# Patient Record
Sex: MALE | Race: WHITE | NOT HISPANIC OR LATINO | Employment: OTHER | URBAN - NONMETROPOLITAN AREA
[De-identification: names, ages, dates, MRNs, and addresses within clinical notes are randomized per-mention and may not be internally consistent; named-entity substitution may affect disease eponyms.]

---

## 2023-09-15 ENCOUNTER — TELEPHONE (OUTPATIENT)
Dept: NEUROSURGERY | Facility: CLINIC | Age: 78
End: 2023-09-15
Payer: MEDICARE

## 2023-09-15 ENCOUNTER — OFFICE VISIT (OUTPATIENT)
Dept: NEUROSURGERY | Facility: CLINIC | Age: 78
End: 2023-09-15
Payer: MEDICARE

## 2023-09-15 VITALS — HEIGHT: 72 IN | WEIGHT: 192 LBS | BODY MASS INDEX: 26.01 KG/M2

## 2023-09-15 DIAGNOSIS — M51.36 LUMBAR DEGENERATIVE DISC DISEASE: Primary | ICD-10-CM

## 2023-09-15 DIAGNOSIS — M54.50 LUMBAR BACK PAIN: ICD-10-CM

## 2023-09-15 DIAGNOSIS — G60.9 HEREDITARY AND IDIOPATHIC PERIPHERAL NEUROPATHY: ICD-10-CM

## 2023-09-15 DIAGNOSIS — F17.200 SMOKER: ICD-10-CM

## 2023-09-15 PROBLEM — G89.29 CHRONIC LOW BACK PAIN: Status: ACTIVE | Noted: 2023-09-15

## 2023-09-15 RX ORDER — FAMOTIDINE 20 MG/1
20 TABLET, FILM COATED ORAL DAILY
COMMUNITY

## 2023-09-15 RX ORDER — NITROGLYCERIN 0.4 MG/1
0.4 TABLET SUBLINGUAL
COMMUNITY

## 2023-09-15 RX ORDER — SIMVASTATIN 40 MG
40 TABLET ORAL NIGHTLY
COMMUNITY
Start: 2023-06-22

## 2023-09-15 RX ORDER — ASPIRIN 81 MG/1
81 TABLET ORAL DAILY
COMMUNITY

## 2023-09-15 RX ORDER — GABAPENTIN 300 MG/1
300 CAPSULE ORAL 3 TIMES DAILY
COMMUNITY

## 2023-09-15 RX ORDER — AMLODIPINE BESYLATE 5 MG/1
5 TABLET ORAL
COMMUNITY

## 2023-09-15 RX ORDER — SILDENAFIL 100 MG/1
100 TABLET, FILM COATED ORAL AS NEEDED
COMMUNITY

## 2023-09-15 RX ORDER — CETIRIZINE HYDROCHLORIDE 10 MG/1
10 TABLET ORAL DAILY
COMMUNITY
Start: 2023-06-22

## 2023-09-15 RX ORDER — EPINEPHRINE 0.3 MG/.3ML
0.3 INJECTION SUBCUTANEOUS DAILY PRN
COMMUNITY

## 2023-09-15 NOTE — PROGRESS NOTES
"    Chief complaint:   Chief Complaint   Patient presents with    Post-op     3rd post op visit from lumbar surgery with Dr. Noguera on March 23 2023. Having some lower back pain and numbness in his legs       Subjective     HPI: Bony is 6 months status post lumbar fusion.  He is having some low-grade pain in the right SI joint region and intermittent paresthesias into the anterior thigh and lower leg.  Preoperatively he was having severe right leg weakness and indicates that the weakness has significantly improved.  He is not requiring any medications.  He does have history of peripheral neuropathy and is taking 1500 mg of gabapentin twice daily.  He admits that he has not been compliant with his lifting restrictions.  He denies any new radicular symptoms.    Review of Systems   HENT:  Positive for postnasal drip.    Musculoskeletal:  Positive for arthralgias.   Neurological:  Positive for numbness.      Past Medical History:   Diagnosis Date    Arthritis     DM (diabetes mellitus)     Hepatitis C      Past Surgical History:   Procedure Laterality Date    BACK SURGERY      x 3    HERNIA REPAIR      NECK SURGERY      x 1    TOTAL KNEE ARTHROPLASTY       History reviewed. No pertinent family history.  Social History     Tobacco Use    Smoking status: Every Day     Types: Cigarettes, Cigars    Smokeless tobacco: Never   Substance Use Topics    Alcohol use: Yes     Comment: 1 glass of [place] at night     (Not in a hospital admission)    Allergies:  Meperidine, Oxycodone-acetaminophen, Ibuprofen, Hydrocodone-acetaminophen, Acetaminophen, and Colchicine    Objective      Vital Signs  Ht 182.9 cm (72\")   Wt 87.1 kg (192 lb)   BMI 26.04 kg/m²     Physical Exam  Constitutional:       Appearance: Normal appearance. He is well-developed.   HENT:      Head: Normocephalic.   Eyes:      General: Lids are normal.      Conjunctiva/sclera: Conjunctivae normal.   Pulmonary:      Effort: Pulmonary effort is normal.      Breath sounds: " Normal breath sounds.   Musculoskeletal:         General: Normal range of motion.      Cervical back: Normal range of motion.   Skin:     General: Skin is warm.   Neurological:      Mental Status: He is alert and oriented to person, place, and time.      GCS: GCS eye subscore is 4. GCS verbal subscore is 5. GCS motor subscore is 6.      Cranial Nerves: No cranial nerve deficit.      Sensory: Sensory deficit present.      Gait: Gait is intact.      Deep Tendon Reflexes: Reflexes are normal and symmetric. Reflexes normal.   Psychiatric:         Speech: Speech normal.         Behavior: Behavior normal.         Thought Content: Thought content normal.     Neurologic Exam     Mental Status   Oriented to person, place, and time.   Speech: speech is normal   Level of consciousness: alert  Knowledge: good.     Motor Exam     Strength   Right iliopsoas: 5/5  Left iliopsoas: 5/5  Right quadriceps: 5/5  Left quadriceps: 5/5  Right hamstrin/5  Left hamstrin/5  Right glutei: 5/5  Left glutei: 5/5  Right anterior tibial: 5/5  Left anterior tibial: 5/5  Right posterior tibial: 5/5  Left posterior tibial: 5/5  Right peroneal: 5/5  Left peroneal: 5/5  Right gastroc: 5/5  Left gastroc: 5/5    Sensory Exam   Sensory deficit distribution on right: L5    Gait, Coordination, and Reflexes     Gait  Gait: normal        Assessment/Plan: Bony is doing well status post lumbar fusion 6 months ago.  He is neurologically stable.  He understands that the paresthesias affecting his right leg may not completely.  I again discussed activity restrictions with him in detail.  I would like to get a CT of lumbar spine without contrast to make sure he is fusing.  He will follow-up with Dr. Noguera for the CT is completed for review.  I advised the patient to call and return sooner for new or worsening complaints of weakness, paresthesias, gait disturbances, or any additional concerns.  Treatment options discussed in detail with Sidney and the  patient voiced understanding.  Mr. Marquez agrees with this plan of care.     Patient is a smoker. Smoking cessation classes given to the patient  The patient's Body mass index is 26.04 kg/m².. BMI is within normal parameters. No follow-up required.  Advance Care Planning     ADVANCED CARE PLANNING  Information on advance directives provided in AVS.    NARCISA Fall Risk Assessment was completed, and patient is at LOW risk for falls.Assessment completed on:9/15/2023       Diagnoses and all orders for this visit:    1. Lumbar degenerative disc disease (Primary)  -     CT Lumbar Spine Without Contrast; Future    2. Lumbar back pain  -     CT Lumbar Spine Without Contrast; Future    3. Hereditary and idiopathic peripheral neuropathy    4. Smoker    5. Body mass index (BMI) of 26.0-26.9 in adult          I discussed the patients findings and my recommendations with patient    Nitish Roberts PA-C  09/15/23  09:48 CDT

## 2023-09-15 NOTE — TELEPHONE ENCOUNTER
CALLED PT TO INFORM OF CT L SPINE APT AT [facility]  10/03/2023 @ 10:00    PTS WIFE ANSWERED AND APT DATE AND TIME GIVEN TO PT

## 2023-11-06 ENCOUNTER — OFFICE VISIT (OUTPATIENT)
Dept: NEUROSURGERY | Facility: CLINIC | Age: 78
End: 2023-11-06
Payer: MEDICARE

## 2023-11-06 VITALS — HEIGHT: 72 IN | WEIGHT: 185 LBS | BODY MASS INDEX: 25.06 KG/M2

## 2023-11-06 DIAGNOSIS — M51.36 LUMBAR DEGENERATIVE DISC DISEASE: Primary | ICD-10-CM

## 2023-11-06 DIAGNOSIS — E66.3 OVERWEIGHT (BMI 25.0-29.9): ICD-10-CM

## 2023-11-06 DIAGNOSIS — F17.200 SMOKER: ICD-10-CM

## 2023-11-06 PROCEDURE — 1160F RVW MEDS BY RX/DR IN RCRD: CPT | Performed by: PHYSICIAN ASSISTANT

## 2023-11-06 PROCEDURE — 99213 OFFICE O/P EST LOW 20 MIN: CPT | Performed by: PHYSICIAN ASSISTANT

## 2023-11-06 PROCEDURE — 1159F MED LIST DOCD IN RCRD: CPT | Performed by: PHYSICIAN ASSISTANT

## 2023-11-06 NOTE — PROGRESS NOTES
"    Chief complaint:   Chief Complaint   Patient presents with    Back Pain     Follow up on lumbar pain discuss CT scan        Subjective     HPI: Bony is 8 months status post L3-5 lumbar fusion.  He is doing well.  He continues with some paresthesias affecting the right leg and an L5 distribution but indicates it is now intermittent.  He does also have some intermittent pain left inferior gluteal region that does not radiate.  He states that when it flares up, it is painful in every position but does not interfere with sleep and does not go down the leg.  He denies any focal weakness.  He is still taking gabapentin.  He is mostly concerned today with left knee issues and plans to get set up with general orthopedics.    Review of Systems      Objective      Vital Signs  Ht 182.9 cm (72\")   Wt 83.9 kg (185 lb)   BMI 25.09 kg/m²     Physical Exam  Constitutional:       Appearance: Normal appearance. He is well-developed.   HENT:      Head: Normocephalic.   Eyes:      Pupils: Pupils are equal, round, and reactive to light.   Pulmonary:      Effort: Pulmonary effort is normal.   Musculoskeletal:         General: Normal range of motion.      Cervical back: Normal range of motion.   Skin:     General: Skin is warm.   Neurological:      Mental Status: He is alert and oriented to person, place, and time.      GCS: GCS eye subscore is 4. GCS verbal subscore is 5. GCS motor subscore is 6.      Cranial Nerves: Cranial nerves 2-12 are intact. No cranial nerve deficit.      Sensory: No sensory deficit.      Gait: Gait is intact. Gait normal.      Deep Tendon Reflexes: Reflexes are normal and symmetric.   Psychiatric:         Speech: Speech normal.         Behavior: Behavior normal.         Thought Content: Thought content normal.         Neurologic Exam     Mental Status   Oriented to person, place, and time.   Speech: speech is normal   Level of consciousness: alert  Knowledge: good.     Cranial Nerves   Cranial nerves II " through XII intact.     CN III, IV, VI   Pupils are equal, round, and reactive to light.    Motor Exam     Strength   Right iliopsoas: 5/5  Left iliopsoas: 5/5  Right quadriceps: 5/5  Left quadriceps: 5/5  Right hamstrin/5  Left hamstrin/5  Right anterior tibial: 5/5  Left anterior tibial: 5/5  Right posterior tibial: 5/5  Left posterior tibial: 5/5  Right peroneal: 5/5  Left peroneal: 5/5    Sensory Exam   Light touch normal.     Gait, Coordination, and Reflexes     Gait  Gait: normal      Imaging review: CT of the lumbar spine from outside facility was reviewed and demonstrates stable fusion changes L3-5.  Implants are well-positioned.  There is no haloing around the screws or evidence of hardware fracture or failure.  Facets are fused at L4-5 and on the right at L3-4.  There is severe disc degeneration L1-2 and moderate disc degeneration L2-3 and L5-S1.  There is slight retrolisthesis at those levels.    CT report is not available for review.        Assessment/Plan: I reviewed CT in detail with Bony.  His fusion changes appear stable.  He does of course have disc degeneration adjacent levels but he is neurologically stable.  I offered to get him set up for some physical therapy for the left gluteal pain he is intermittently having but he declines.  He is also not interested in pain management.  He can increase his lifting to 20 pounds.  I would like for him to follow-up in about 3 months with Dr. Noguera, sooner if he has any issues.    Patient is a smoker. Smoking cessation classes given to the patient    The patient's Body mass index is 25.09 kg/m².. BMI is above normal parameters. Recommendations include: educational material    ADVANCED CARE PLANNING  Information on advance directives provided in AVS.    NARCISA Fall Risk Assessment was completed, and patient is at HIGH risk for falls. Assessment completed on:2023     Diagnoses and all orders for this visit:    1. Lumbar degenerative disc disease  (Primary)    2. Smoker    3. Overweight (BMI 25.0-29.9)        I discussed the patients findings and my recommendations with patient  Nitish Roberts PA-C  11/06/23  09:20 CST

## 2024-02-05 ENCOUNTER — OFFICE VISIT (OUTPATIENT)
Dept: NEUROSURGERY | Facility: CLINIC | Age: 79
End: 2024-02-05
Payer: MEDICARE

## 2024-02-05 VITALS — BODY MASS INDEX: 25.06 KG/M2 | HEIGHT: 72 IN | WEIGHT: 185 LBS

## 2024-02-05 DIAGNOSIS — M54.50 LUMBAR BACK PAIN: Primary | ICD-10-CM

## 2024-02-05 DIAGNOSIS — E66.3 OVERWEIGHT: ICD-10-CM

## 2024-02-05 DIAGNOSIS — F17.200 SMOKER: ICD-10-CM

## 2024-02-05 NOTE — PROGRESS NOTES
"    Chief complaint:   Chief Complaint   Patient presents with    Follow-up     Follow up low back pain, pins and needles right leg        Subjective     HPI: I had a chance to see Sidney today in follow-up and to review his imaging studies of the lumbar spine.  He does appear to be fusing at L3/4 and he really has no back pain at this time.  His biggest problem now is he is likely going to need shoulder replacements and he does get some leg cramps at night.    Review of Systems      Objective      Vital Signs  Ht 182.9 cm (72\")   Wt 83.9 kg (185 lb)   BMI 25.09 kg/m²     Physical Exam  Neurological:      Mental Status: He is oriented to person, place, and time.      Cranial Nerves: Cranial nerves 2-12 are intact.      Motor: Motor strength is normal.     Gait: Gait is intact.   Psychiatric:         Speech: Speech normal.         Neurologic Exam     Mental Status   Oriented to person, place, and time.   Attention: normal. Concentration: normal.   Speech: speech is normal   Level of consciousness: alert  Knowledge: good.   Normal comprehension.     Cranial Nerves   Cranial nerves II through XII intact.     Motor Exam     Strength   Strength 5/5 throughout.     Sensory Exam   Light touch normal.     Gait, Coordination, and Reflexes     Gait  Gait: normal      Imaging review: CT scan of the lumbar spine does show all of his instrumentation L3/4 is in place and he is starting to fuse across the facet posteriorly and likely anteriorly across the interbody graft.        Assessment/Plan:   Status post L3/4 transforaminal lumbar interbody fusion with revision of pedicle screw instrumentation.    Is doing very well at this time and I do think that we can release him from our care, he will give us a call if any new issues arise.  He may follow-up with us on a as needed basis.    Patient is a smoker  The patient's Body mass index is 25.09 kg/m².. BMI is within normal parameters. No follow-up required.    Diagnoses and all " orders for this visit:    1. Lumbar back pain (Primary)    2. Smoker    3. Overweight        I discussed the patients findings and my recommendations with patient  Savage Noguera DO  02/05/24  09:46 CST